# Patient Record
Sex: MALE | Race: ASIAN | NOT HISPANIC OR LATINO | Employment: FULL TIME | URBAN - METROPOLITAN AREA
[De-identification: names, ages, dates, MRNs, and addresses within clinical notes are randomized per-mention and may not be internally consistent; named-entity substitution may affect disease eponyms.]

---

## 2022-07-07 ENCOUNTER — HOSPITAL ENCOUNTER (EMERGENCY)
Facility: HOSPITAL | Age: 42
Discharge: HOME | End: 2022-07-07
Attending: FAMILY MEDICINE

## 2022-07-07 VITALS
SYSTOLIC BLOOD PRESSURE: 135 MMHG | RESPIRATION RATE: 20 BRPM | WEIGHT: 170 LBS | BODY MASS INDEX: 24.34 KG/M2 | OXYGEN SATURATION: 37 % | DIASTOLIC BLOOD PRESSURE: 95 MMHG | HEART RATE: 95 BPM | HEIGHT: 70 IN | TEMPERATURE: 99 F

## 2022-07-07 DIAGNOSIS — R52 PAIN: ICD-10-CM

## 2022-07-07 DIAGNOSIS — Z53.21 ELOPED FROM EMERGENCY DEPARTMENT: ICD-10-CM

## 2022-07-07 DIAGNOSIS — S42.114A CLOSED NONDISPLACED FRACTURE OF BODY OF RIGHT SCAPULA, INITIAL ENCOUNTER: Primary | ICD-10-CM

## 2022-07-07 PROCEDURE — 25000003 PHARM REV CODE 250: Performed by: FAMILY MEDICINE

## 2022-07-07 PROCEDURE — 99284 EMERGENCY DEPT VISIT MOD MDM: CPT | Mod: 25

## 2022-07-07 RX ORDER — TADALAFIL 10 MG/1
10 TABLET ORAL
COMMUNITY
Start: 2022-06-15

## 2022-07-07 RX ORDER — HYDROCODONE BITARTRATE AND ACETAMINOPHEN 7.5; 325 MG/1; MG/1
1 TABLET ORAL EVERY 6 HOURS PRN
Status: DISCONTINUED | OUTPATIENT
Start: 2022-07-07 | End: 2022-07-07 | Stop reason: HOSPADM

## 2022-07-07 RX ADMIN — HYDROCODONE BITARTRATE AND ACETAMINOPHEN 1 TABLET: 7.5; 325 TABLET ORAL at 05:07

## 2022-07-07 NOTE — ED TRIAGE NOTES
Pt to er c/o injury to right shoulder s/p slip and fall. States hit shoulder against counter. Limited rom noted.

## 2022-07-07 NOTE — ED PROVIDER NOTES
"Encounter Date: 7/7/2022       History     Chief Complaint   Patient presents with    Shoulder Injury     Pt to er c/o injury to right shoulder s/p slip and fall. States hit shoulder against counter. Limited rom noted.     42-year-old male presents to the ED with complaint of left shoulder pain.  Patient reports 2 days ago he slipped in the bathroom and hit his left shoulder on the counter.  Patient reports " the pain was okay however progressively got worse to the point of unable to move my left shoulder."  Patient denies fever, chills, weakness, dizziness, headache.    The history is provided by the patient. No  was used.     Review of patient's allergies indicates:  No Known Allergies  Past Medical History:   Diagnosis Date    Male erectile dysfunction, unspecified      No past surgical history on file.  No family history on file.     Review of Systems   Constitutional: Negative for fever.   HENT: Negative for sore throat.    Respiratory: Negative for shortness of breath.    Cardiovascular: Negative for chest pain.   Gastrointestinal: Negative for nausea and vomiting.   Musculoskeletal: Negative for back pain.        Joint pain      Skin: Negative for rash.   Neurological: Negative for weakness.   All other systems reviewed and are negative.      Physical Exam     Initial Vitals [07/07/22 1525]   BP Pulse Resp Temp SpO2   (!) 135/95 95 18 98.6 °F (37 °C) (!) 37 %      MAP       --         Physical Exam    Nursing note and vitals reviewed.  Constitutional: He appears well-developed and well-nourished. No distress.   HENT:   Head: Normocephalic and atraumatic.   Eyes: Conjunctivae are normal.   Cardiovascular: Regular rhythm.   Pulmonary/Chest: Breath sounds normal.   Abdominal: Abdomen is soft. Bowel sounds are normal. There is no abdominal tenderness. There is no rebound and no guarding.   Musculoskeletal:      Comments: Left UE- currently held internally rotated and adducted.   Left UE- NVI "      Neurological: He is alert and oriented to person, place, and time. He has normal strength. GCS score is 15. GCS eye subscore is 4. GCS verbal subscore is 5. GCS motor subscore is 6.   Skin: Skin is warm and dry.   Psychiatric: He has a normal mood and affect. His behavior is normal. Judgment and thought content normal.         ED Course   Procedures  Labs Reviewed - No data to display       Imaging Results          CT Shoulder Without Contrast Right (Final result)  Result time 07/07/22 18:06:51    Final result by Grupo Arreola MD (07/07/22 18:06:51)                 Impression:      Scapular fracture.      Electronically signed by: Grupo Arreola  Date:    07/07/2022  Time:    18:06             Narrative:    EXAMINATION:  CT SHOULDER WITHOUT CONTRAST RIGHT    CLINICAL HISTORY:  Shoulder pain, traumatic, neg xray (Ped 0-18y);    TECHNIQUE:  Multidetector axial images were performed of the right shoulder without administration of contrast.  Images were reconstructed.    Dose length product was 265 mGycm. Automated radiation control was utilized to minimize radiation dose.    COMPARISON:  Same date    FINDINGS:  There is fracture of the scapular spine seen on image 76 series 7 and the axial image 25 series 5.  This fracture shows slight displacement.  Humeral head is situated within the glenoid without acute fracture or dislocation.  There is also no separation of the acromioclavicular joint.    Visualized portion the right lung is clear.                               X-Ray Shoulder Complete 2 View Right (Final result)  Result time 07/07/22 16:43:14    Final result by Gabriela Luna MD (07/07/22 16:43:14)                 Impression:      No acute osseous abnormality.      Electronically signed by: Gabriela Luna  Date:    07/07/2022  Time:    16:43             Narrative:    EXAMINATION:  XR SHOULDER COMPLETE 2 OR MORE VIEWS RIGHT    CLINICAL HISTORY:  Pain, unspecified    TECHNIQUE:  Three views of the  right shoulder were performed.    COMPARISON  None    FINDINGS:  BONES: No fracture. No dislocation.    SOFT TISSUES:  Regional soft tissues are normal.                                 Medications   HYDROcodone-acetaminophen 7.5-325 mg per tablet 1 tablet (1 tablet Oral Given 7/7/22 1746)     Medical Decision Making:   ED Management:  I went in to pt room 3 x to discuss the CT results and the plan with patient.  I have spoken with orthopedic on-call-Dr. Arriaza who recommends sling and outpatient management. Pt can be seen in clinic per him.      I could not find the patient anywhere emergency department. I asked  charge RN regarding patient's whereabouts however pt can not be found.  Pt has eloped.                        Clinical Impression:   Final diagnoses:  [R52] Pain  [S42.114A] Closed nondisplaced fracture of body of right scapula, initial encounter (Primary)  [Z53.21] Eloped from emergency department          ED Disposition Condition    Eloped               Griselda Landis MD  07/07/22 5011

## 2022-07-08 NOTE — PROGRESS NOTES
Called for scapular body fracture located about the spine. Extra-articular. Minimal displacement. Minimal comminution. Recommend nonweightbearing range of motion as tolerated sling nonoperative management. Follow up in the office in the next 1 to 2 weeks.    Arsalan

## 2023-05-24 ENCOUNTER — HOSPITAL ENCOUNTER (EMERGENCY)
Facility: HOSPITAL | Age: 43
Discharge: HOME | End: 2023-05-24
Attending: STUDENT IN AN ORGANIZED HEALTH CARE EDUCATION/TRAINING PROGRAM
Payer: COMMERCIAL

## 2023-05-24 VITALS
WEIGHT: 170 LBS | SYSTOLIC BLOOD PRESSURE: 124 MMHG | TEMPERATURE: 99 F | DIASTOLIC BLOOD PRESSURE: 96 MMHG | RESPIRATION RATE: 18 BRPM | BODY MASS INDEX: 24.34 KG/M2 | HEIGHT: 70 IN | HEART RATE: 89 BPM | OXYGEN SATURATION: 100 %

## 2023-05-24 DIAGNOSIS — S60.221A CONTUSION OF RIGHT HAND, INITIAL ENCOUNTER: Primary | ICD-10-CM

## 2023-05-24 PROCEDURE — 99284 EMERGENCY DEPT VISIT MOD MDM: CPT

## 2023-05-24 PROCEDURE — 25000003 PHARM REV CODE 250: Performed by: STUDENT IN AN ORGANIZED HEALTH CARE EDUCATION/TRAINING PROGRAM

## 2023-05-24 RX ORDER — HYDROCODONE BITARTRATE AND ACETAMINOPHEN 5; 325 MG/1; MG/1
1 TABLET ORAL EVERY 6 HOURS PRN
Qty: 12 TABLET | Refills: 0 | Status: SHIPPED | OUTPATIENT
Start: 2023-05-24

## 2023-05-24 RX ORDER — HYDROCODONE BITARTRATE AND ACETAMINOPHEN 5; 325 MG/1; MG/1
1 TABLET ORAL
Status: COMPLETED | OUTPATIENT
Start: 2023-05-24 | End: 2023-05-24

## 2023-05-24 RX ORDER — IBUPROFEN 600 MG/1
600 TABLET ORAL EVERY 6 HOURS PRN
Qty: 30 TABLET | Refills: 0 | Status: SHIPPED | OUTPATIENT
Start: 2023-05-24

## 2023-05-24 RX ADMIN — HYDROCODONE BITARTRATE AND ACETAMINOPHEN 1 TABLET: 5; 325 TABLET ORAL at 09:05

## 2023-05-24 NOTE — ED PROVIDER NOTES
Encounter Date: 5/24/2023       History     Chief Complaint   Patient presents with    Hand Injury     Pt complaint of pain to right hand with door closed on hand yesterday evening     HPI    43-year-old male with no known past medical history presents emergency department for right hand pain.  Patient states approximately 24 hours ago he slammed his right hand into his house door.  States that he went to sleep it off with the pain continued.  States it hurts to move his fingers.    Review of patient's allergies indicates:  No Known Allergies  Past Medical History:   Diagnosis Date    Male erectile dysfunction, unspecified      No past surgical history on file.  No family history on file.     Review of Systems   Constitutional:  Negative for fever.   Respiratory:  Negative for cough and shortness of breath.    Cardiovascular:  Negative for chest pain.   Gastrointestinal:  Negative for abdominal pain.   Musculoskeletal:         Hand pain   All other systems reviewed and are negative.    Physical Exam     Initial Vitals [05/24/23 0910]   BP Pulse Resp Temp SpO2   (!) 124/96 89 18 98.6 °F (37 °C) 100 %      MAP       --         Physical Exam    Nursing note and vitals reviewed.  Constitutional: He appears well-developed and well-nourished. No distress.   Cardiovascular:  Normal rate, regular rhythm and intact distal pulses.           Pulmonary/Chest: Breath sounds normal.   Abdominal: Abdomen is soft.   Musculoskeletal:         General: Tenderness (generalized tenderness to the dorsal aspect of the right hand) present.     Neurological: He is alert and oriented to person, place, and time.   Skin: Skin is warm. Capillary refill takes less than 2 seconds. No rash noted. No erythema.       ED Course   Procedures  Labs Reviewed - No data to display       Imaging Results              X-Ray Hand 3 view Right (Final result)  Result time 05/24/23 09:47:05      Final result by Gabriela Luna MD (05/24/23 09:47:05)                    Impression:      No acute osseous abnormality.      Electronically signed by: Gabriela Luna  Date:    05/24/2023  Time:    09:47               Narrative:    EXAMINATION:  XR HAND COMPLETE 3 VIEW RIGHT    CLINICAL HISTORY:  injury;    TECHNIQUE:  PA, lateral, and oblique views of the right hand were performed.    COMPARISON:  None    FINDINGS:  No fracture. No dislocation.    Regional soft tissues are normal.                                       Medications   HYDROcodone-acetaminophen 5-325 mg per tablet 1 tablet (1 tablet Oral Given 5/24/23 0957)     Medical Decision Making:   Differential Diagnosis:   Contusion, sprain, fracture         Medical Decision Making  Problems Addressed:  Contusion of right hand, initial encounter: self-limited or minor problem    Amount and/or Complexity of Data Reviewed  Radiology: ordered and independent interpretation performed. Decision-making details documented in ED Course.    Risk  OTC drugs.  Prescription drug management.        ED Course as of 05/24/23 1128   Wed May 24, 2023   0951 X-Ray Hand 3 view Right  No acute fractures identified [BS]      ED Course User Index  [BS] Keshawn Olmos MD                 Clinical Impression:   Final diagnoses:  [S60.221A] Contusion of right hand, initial encounter (Primary)        ED Disposition Condition    Discharge Stable          ED Prescriptions       Medication Sig Dispense Start Date End Date Auth. Provider    HYDROcodone-acetaminophen (NORCO) 5-325 mg per tablet Take 1 tablet by mouth every 6 (six) hours as needed for Pain. 12 tablet 5/24/2023 -- Keshawn Olmos MD    ibuprofen (ADVIL,MOTRIN) 600 MG tablet Take 1 tablet (600 mg total) by mouth every 6 (six) hours as needed for Pain. 30 tablet 5/24/2023 -- Keshawn Olmos MD          Follow-up Information       Follow up With Specialties Details Why Contact Info    [facility] - Emergency Dept Emergency Medicine Go to  If symptoms worsen [id number]  [facility] [address]  [phone]             Keshawn Olmos MD  05/24/23 112

## 2023-05-24 NOTE — Clinical Note
"Saul "Peri Parrish was seen and treated in our emergency department on 5/24/2023.  He may return to work on 05/25/2023.       If you have any questions or concerns, please don't hesitate to call.      Keshawn Olmos MD"

## 2024-08-19 ENCOUNTER — HOSPITAL ENCOUNTER (EMERGENCY)
Facility: HOSPITAL | Age: 44
Discharge: HOME | End: 2024-08-20
Attending: INTERNAL MEDICINE
Payer: COMMERCIAL

## 2024-08-19 VITALS
OXYGEN SATURATION: 98 % | BODY MASS INDEX: 25.05 KG/M2 | TEMPERATURE: 97 F | WEIGHT: 175 LBS | DIASTOLIC BLOOD PRESSURE: 74 MMHG | RESPIRATION RATE: 18 BRPM | HEART RATE: 74 BPM | SYSTOLIC BLOOD PRESSURE: 111 MMHG | HEIGHT: 70 IN

## 2024-08-19 DIAGNOSIS — J44.1 COPD EXACERBATION: Primary | ICD-10-CM

## 2024-08-19 LAB
ALBUMIN SERPL-MCNC: 3.6 G/DL (ref 3.5–5)
ALBUMIN/GLOB SERPL: 1.2 RATIO (ref 1.1–2)
ALP SERPL-CCNC: 71 UNIT/L (ref 40–150)
ALT SERPL-CCNC: 20 UNIT/L (ref 0–55)
ANION GAP SERPL CALC-SCNC: 9 MEQ/L
AST SERPL-CCNC: 14 UNIT/L (ref 5–34)
BASOPHILS # BLD AUTO: 0.13 X10(3)/MCL
BASOPHILS NFR BLD AUTO: 1.1 %
BILIRUB SERPL-MCNC: 0.3 MG/DL
BUN SERPL-MCNC: 12.7 MG/DL (ref 8.9–20.6)
CALCIUM SERPL-MCNC: 9.4 MG/DL (ref 8.4–10.2)
CHLORIDE SERPL-SCNC: 108 MMOL/L (ref 98–107)
CO2 SERPL-SCNC: 25 MMOL/L (ref 22–29)
CREAT SERPL-MCNC: 1.14 MG/DL (ref 0.73–1.18)
CREAT/UREA NIT SERPL: 11
EOSINOPHIL # BLD AUTO: 1.5 X10(3)/MCL (ref 0–0.9)
EOSINOPHIL NFR BLD AUTO: 12.7 %
ERYTHROCYTE [DISTWIDTH] IN BLOOD BY AUTOMATED COUNT: 13.6 % (ref 11.5–17)
FLUAV AG UPPER RESP QL IA.RAPID: NOT DETECTED
FLUBV AG UPPER RESP QL IA.RAPID: NOT DETECTED
GFR SERPLBLD CREATININE-BSD FMLA CKD-EPI: >60 ML/MIN/1.73/M2
GLOBULIN SER-MCNC: 3.1 GM/DL (ref 2.4–3.5)
GLUCOSE SERPL-MCNC: 99 MG/DL (ref 74–100)
HCT VFR BLD AUTO: 50.1 % (ref 42–52)
HGB BLD-MCNC: 16 G/DL (ref 14–18)
IMM GRANULOCYTES # BLD AUTO: 0.07 X10(3)/MCL (ref 0–0.04)
IMM GRANULOCYTES NFR BLD AUTO: 0.6 %
LYMPHOCYTES # BLD AUTO: 3.73 X10(3)/MCL (ref 0.6–4.6)
LYMPHOCYTES NFR BLD AUTO: 31.5 %
MCH RBC QN AUTO: 30.8 PG (ref 27–31)
MCHC RBC AUTO-ENTMCNC: 31.9 G/DL (ref 33–36)
MCV RBC AUTO: 96.5 FL (ref 80–94)
MONOCYTES # BLD AUTO: 1.09 X10(3)/MCL (ref 0.1–1.3)
MONOCYTES NFR BLD AUTO: 9.2 %
NEUTROPHILS # BLD AUTO: 5.31 X10(3)/MCL (ref 2.1–9.2)
NEUTROPHILS NFR BLD AUTO: 44.9 %
NRBC BLD AUTO-RTO: 0 %
PLATELET # BLD AUTO: 327 X10(3)/MCL (ref 130–400)
PMV BLD AUTO: 9.4 FL (ref 7.4–10.4)
POTASSIUM SERPL-SCNC: 4.2 MMOL/L (ref 3.5–5.1)
PROT SERPL-MCNC: 6.7 GM/DL (ref 6.4–8.3)
RBC # BLD AUTO: 5.19 X10(6)/MCL (ref 4.7–6.1)
SARS-COV-2 RNA RESP QL NAA+PROBE: NOT DETECTED
SODIUM SERPL-SCNC: 142 MMOL/L (ref 136–145)
TROPONIN I SERPL-MCNC: <0.01 NG/ML (ref 0–0.04)
WBC # BLD AUTO: 11.83 X10(3)/MCL (ref 4.5–11.5)

## 2024-08-19 PROCEDURE — 63600175 PHARM REV CODE 636 W HCPCS: Performed by: INTERNAL MEDICINE

## 2024-08-19 PROCEDURE — 85025 COMPLETE CBC W/AUTO DIFF WBC: CPT | Performed by: INTERNAL MEDICINE

## 2024-08-19 PROCEDURE — 99900031 HC PATIENT EDUCATION (STAT)

## 2024-08-19 PROCEDURE — 99284 EMERGENCY DEPT VISIT MOD MDM: CPT | Mod: 25

## 2024-08-19 PROCEDURE — 96372 THER/PROPH/DIAG INJ SC/IM: CPT | Performed by: INTERNAL MEDICINE

## 2024-08-19 PROCEDURE — 80053 COMPREHEN METABOLIC PANEL: CPT | Performed by: INTERNAL MEDICINE

## 2024-08-19 PROCEDURE — 94640 AIRWAY INHALATION TREATMENT: CPT

## 2024-08-19 PROCEDURE — 0240U COVID/FLU A&B PCR: CPT | Performed by: INTERNAL MEDICINE

## 2024-08-19 PROCEDURE — 25000242 PHARM REV CODE 250 ALT 637 W/ HCPCS: Performed by: INTERNAL MEDICINE

## 2024-08-19 PROCEDURE — 84484 ASSAY OF TROPONIN QUANT: CPT | Performed by: INTERNAL MEDICINE

## 2024-08-19 RX ORDER — DEXAMETHASONE SODIUM PHOSPHATE 4 MG/ML
8 INJECTION, SOLUTION INTRA-ARTICULAR; INTRALESIONAL; INTRAMUSCULAR; INTRAVENOUS; SOFT TISSUE
Status: COMPLETED | OUTPATIENT
Start: 2024-08-19 | End: 2024-08-19

## 2024-08-19 RX ORDER — IPRATROPIUM BROMIDE AND ALBUTEROL SULFATE 2.5; .5 MG/3ML; MG/3ML
3 SOLUTION RESPIRATORY (INHALATION)
Status: COMPLETED | OUTPATIENT
Start: 2024-08-19 | End: 2024-08-19

## 2024-08-19 RX ADMIN — DEXAMETHASONE SODIUM PHOSPHATE 8 MG: 4 INJECTION, SOLUTION INTRA-ARTICULAR; INTRALESIONAL; INTRAMUSCULAR; INTRAVENOUS; SOFT TISSUE at 10:08

## 2024-08-19 RX ADMIN — IPRATROPIUM BROMIDE AND ALBUTEROL SULFATE 3 ML: 2.5; .5 SOLUTION RESPIRATORY (INHALATION) at 10:08

## 2024-08-20 RX ORDER — NEBULIZER AND COMPRESSOR
EACH MISCELLANEOUS
Qty: 1 EACH | Refills: 0 | Status: SHIPPED | OUTPATIENT
Start: 2024-08-20

## 2024-08-20 RX ORDER — AZITHROMYCIN 250 MG/1
TABLET, FILM COATED ORAL
Qty: 6 TABLET | Refills: 0 | Status: SHIPPED | OUTPATIENT
Start: 2024-08-20

## 2024-08-20 RX ORDER — IPRATROPIUM BROMIDE AND ALBUTEROL SULFATE 2.5; .5 MG/3ML; MG/3ML
3 SOLUTION RESPIRATORY (INHALATION) EVERY 6 HOURS PRN
Qty: 50 ML | Refills: 0 | Status: SHIPPED | OUTPATIENT
Start: 2024-08-20

## 2024-08-20 RX ORDER — PREDNISONE 20 MG/1
20 TABLET ORAL DAILY
Qty: 5 TABLET | Refills: 0 | Status: SHIPPED | OUTPATIENT
Start: 2024-08-20 | End: 2024-08-25

## 2024-08-20 NOTE — ED PROVIDER NOTES
"     Source of History:  Patient, no limitations    Chief complaint:  Shortness of Breath and Cough      HPI:  Saul Parrish is a 44 y.o. male presenting with Shortness of Breath and Cough         Patient complains of productive cough and wheezing.  Symptoms began several days ago.  The cough is with wheezing, with shortness of breath and is aggravated by infection and stress Associated symptoms include:chills and fever. Patient does have a history of smoking. Does have hx of COPD        Review of Systems   Constitutional symptoms:  Negative except as documented in HPI.   Skin symptoms:  Negative except as documented in HPI.   HEENT symptoms:  Negative except as documented in HPI.   Respiratory symptoms:  Negative except as documented in HPI.   Cardiovascular symptoms:  Negative except as documented in HPI.   Gastrointestinal symptoms:  Negative except as documented in HPI.    Genitourinary symptoms:  Negative except as documented in HPI.   Musculoskeletal symptoms:  Negative except as documented in HPI.   Neurologic symptoms:  Negative except as documented in HPI.   Psychiatric symptoms:  Negative except as documented in HPI.   Allergy/immunologic symptoms:  Negative except as documented in HPI.             Additional review of systems information: All other systems reviewed and otherwise negative.      Review of patient's allergies indicates:  No Known Allergies    PMH:  As per HPI and below:    Past Medical History:   Diagnosis Date    Male erectile dysfunction, unspecified        History reviewed. No pertinent family history.    History reviewed. No pertinent surgical history.         There is no problem list on file for this patient.       Physical Exam:    /74 (BP Location: Left arm, Patient Position: Lying)   Pulse 74   Temp 97.3 °F (36.3 °C) (Oral)   Resp 18   Ht 5' 10" (1.778 m)   Wt 79.4 kg (175 lb)   SpO2 98%   BMI 25.11 kg/m²     Nursing note and vital signs reviewed.    General:  " Alert, no acute distress.   Skin: Normal for Ethnic Origin, No cyanosis  HEENT: Normocephalic and atraumatic, Vision unchanged, Pupils symmetric, No icterus , Nasal mucosa is pink and moist  Cardiovascular:  Regular rate and rhythm,  Chest Wall: No deformity, equal chest rise  Respiratory:  moderate wheeze BL respirations are non-labored.    Musculoskeletal:  No deformity, Normal perfusion to all extremities  Gastrointestinal:  Soft, Non distended  Neurological:  Alert and oriented, normal motor observed, normal speech observed.    Psychiatric:  Cooperative, appropriate mood & affect.        Labs that have been ordered have been independently reviewed and interpreted by myself.     Old Chart Reviewed.      Initial Impression/ Differential Dx:  Bronchitis, URI, allergies, irritants, pulmonary edema, GERD, laryngitis, tracheitis, asthma, sinusitis, pneumonia, viral, COPD, medication side effect      MDM:      Reviewed Nurses Note.    Reviewed Pertinent old records.    Orders Placed This Encounter    X-Ray Chest 1 View    COVID/FLU A&B PCR    CBC Auto Differential    Comprehensive Metabolic Panel    Troponin I    CBC with Differential    albuterol-ipratropium 2.5 mg-0.5 mg/3 mL nebulizer solution 3 mL    dexAMETHasone injection 8 mg    predniSONE (DELTASONE) 20 MG tablet    azithromycin (Z-EDDIE) 250 MG tablet    albuterol-ipratropium (DUO-NEB) 2.5 mg-0.5 mg/3 mL nebulizer solution    nebulizer and compressor Ivette                    Labs Reviewed   COMPREHENSIVE METABOLIC PANEL - Abnormal       Result Value    Sodium 142      Potassium 4.2      Chloride 108 (*)     CO2 25      Glucose 99      Blood Urea Nitrogen 12.7      Creatinine 1.14      Calcium 9.4      Protein Total 6.7      Albumin 3.6      Globulin 3.1      Albumin/Globulin Ratio 1.2      Bilirubin Total 0.3      ALP 71      ALT 20      AST 14      eGFR >60      Anion Gap 9.0      BUN/Creatinine Ratio 11     CBC WITH DIFFERENTIAL - Abnormal    WBC 11.83 (*)      RBC 5.19      Hgb 16.0      Hct 50.1      MCV 96.5 (*)     MCH 30.8      MCHC 31.9 (*)     RDW 13.6      Platelet 327      MPV 9.4      Neut % 44.9      Lymph % 31.5      Mono % 9.2      Eos % 12.7      Basophil % 1.1      Lymph # 3.73      Neut # 5.31      Mono # 1.09      Eos # 1.50 (*)     Baso # 0.13      IG# 0.07 (*)     IG% 0.6      NRBC% 0.0     COVID/FLU A&B PCR - Normal    Influenza A PCR Not Detected      Influenza B PCR Not Detected      SARS-CoV-2 PCR Not Detected      Narrative:     The Xpert Xpress SARS-CoV-2/FLU/RSV plus is a rapid, multiplexed real-time PCR test intended for the simultaneous qualitative detection and differentiation of SARS-CoV-2, Influenza A, Influenza B, and respiratory syncytial virus (RSV) viral RNA in either nasopharyngeal swab or nasal swab specimens.         TROPONIN I - Normal    Troponin-I <0.010     CBC W/ AUTO DIFFERENTIAL    Narrative:     The following orders were created for panel order CBC Auto Differential.  Procedure                               Abnormality         Status                     ---------                               -----------         ------                     CBC with Differential[[id number]]        Abnormal            Final result                 Please view results for these tests on the individual orders.          X-Ray Chest 1 View    (Results Pending)        Admission on 08/19/2024, Discharged on 08/20/2024   Component Date Value Ref Range Status    Influenza A PCR 08/19/2024 Not Detected  Not Detected Final    Influenza B PCR 08/19/2024 Not Detected  Not Detected Final    SARS-CoV-2 PCR 08/19/2024 Not Detected  Not Detected, Negative Final    Sodium 08/19/2024 142  136 - 145 mmol/L Final    Potassium 08/19/2024 4.2  3.5 - 5.1 mmol/L Final    Chloride 08/19/2024 108 (H)  98 - 107 mmol/L Final    CO2 08/19/2024 25  22 - 29 mmol/L Final    Glucose 08/19/2024 99  74 - 100 mg/dL Final    Blood Urea Nitrogen 08/19/2024 12.7  8.9 - 20.6 mg/dL Final     Creatinine 08/19/2024 1.14  0.73 - 1.18 mg/dL Final    Calcium 08/19/2024 9.4  8.4 - 10.2 mg/dL Final    Protein Total 08/19/2024 6.7  6.4 - 8.3 gm/dL Final    Albumin 08/19/2024 3.6  3.5 - 5.0 g/dL Final    Globulin 08/19/2024 3.1  2.4 - 3.5 gm/dL Final    Albumin/Globulin Ratio 08/19/2024 1.2  1.1 - 2.0 ratio Final    Bilirubin Total 08/19/2024 0.3  <=1.5 mg/dL Final    ALP 08/19/2024 71  40 - 150 unit/L Final    ALT 08/19/2024 20  0 - 55 unit/L Final    AST 08/19/2024 14  5 - 34 unit/L Final    eGFR 08/19/2024 >60  mL/min/1.73/m2 Final    Anion Gap 08/19/2024 9.0  mEq/L Final    BUN/Creatinine Ratio 08/19/2024 11   Final    Troponin-I 08/19/2024 <0.010  0.000 - 0.045 ng/mL Final    WBC 08/19/2024 11.83 (H)  4.50 - 11.50 x10(3)/mcL Final    RBC 08/19/2024 5.19  4.70 - 6.10 x10(6)/mcL Final    Hgb 08/19/2024 16.0  14.0 - 18.0 g/dL Final    Hct 08/19/2024 50.1  42.0 - 52.0 % Final    MCV 08/19/2024 96.5 (H)  80.0 - 94.0 fL Final    MCH 08/19/2024 30.8  27.0 - 31.0 pg Final    MCHC 08/19/2024 31.9 (L)  33.0 - 36.0 g/dL Final    RDW 08/19/2024 13.6  11.5 - 17.0 % Final    Platelet 08/19/2024 327  130 - 400 x10(3)/mcL Final    MPV 08/19/2024 9.4  7.4 - 10.4 fL Final    Neut % 08/19/2024 44.9  % Final    Lymph % 08/19/2024 31.5  % Final    Mono % 08/19/2024 9.2  % Final    Eos % 08/19/2024 12.7  % Final    Basophil % 08/19/2024 1.1  % Final    Lymph # 08/19/2024 3.73  0.6 - 4.6 x10(3)/mcL Final    Neut # 08/19/2024 5.31  2.1 - 9.2 x10(3)/mcL Final    Mono # 08/19/2024 1.09  0.1 - 1.3 x10(3)/mcL Final    Eos # 08/19/2024 1.50 (H)  0 - 0.9 x10(3)/mcL Final    Baso # 08/19/2024 0.13  <=0.2 x10(3)/mcL Final    IG# 08/19/2024 0.07 (H)  0 - 0.04 x10(3)/mcL Final    IG% 08/19/2024 0.6  % Final    NRBC% 08/19/2024 0.0  % Final       Imaging Results              X-Ray Chest 1 View (In process)                                    ED Course as of 08/20/24 0259   Mon Aug 19, 2024   2340 WBC(!): 11.83 [MP]      ED Course User  Index  [MP] Ayush Benavides DO                        Diagnostic Impression:    1. COPD exacerbation         ED Disposition Condition    Discharge Stable             Follow-up Information       [facility] - Emergency Dept.    Specialty: Emergency Medicine  Why: If symptoms worsen  Contact information:  [address]  [facility] [address]  [phone]                            ED Prescriptions       Medication Sig Dispense Start Date End Date Auth. Provider    predniSONE (DELTASONE) 20 MG tablet Take 1 tablet (20 mg total) by mouth once daily. for 5 days 5 tablet 8/20/2024 8/25/2024 Ayush Benavides DO    azithromycin (Z-EDDIE) 250 MG tablet Take 2 tablets by mouth on day 1; Take 1 tablet by mouth on days 2-5 6 tablet 8/20/2024 -- Ayush Benavides DO    albuterol-ipratropium (DUO-NEB) 2.5 mg-0.5 mg/3 mL nebulizer solution Take 3 mLs by nebulization every 6 (six) hours as needed for Wheezing. Rescue 50 mL 8/20/2024 -- Ayush Benavides DO    nebulizer and compressor Ivette For use with albuterol 1 each 8/20/2024 -- Ayush Benavides DO          Follow-up Information       Follow up With Specialties Details Why Contact Info    [facility] - Emergency Dept Emergency Medicine  If symptoms worsen [address]  [facility] [address]  [phone]             Ayush Benavides DO  08/20/24 0259